# Patient Record
(demographics unavailable — no encounter records)

---

## 2024-11-27 NOTE — HISTORY OF PRESENT ILLNESS
[FreeTextEntry1] : Currently doing well. Occasional chest pain which is unrelated to exertion and usually very brief. Denies shortness of breath or palpitations.  passed away about 3 years ago. She moved back to [place] about 2 years ago.

## 2024-11-27 NOTE — DISCUSSION/SUMMARY
[Hypertension] : hypertension [Low Sodium Diet] : low sodium diet [FreeTextEntry1] : Currently stable from a cardiovascular standpoint. Hypertensive today (had left knee injection earlier today). Euvolemic. Atypical chest pains. Continue current medications. ECG completed today and reviewed (findings as noted above). Given patients CAD risk factors, will schedule an exercise nuclear stress test to rule out any significant CAD. In addition, will schedule an echocardiogram to assess her cardiac structures and function. Pending the test results, I will make further recommendations. [EKG obtained to assist in diagnosis and management of assessed problem(s)] : EKG obtained to assist in diagnosis and management of assessed problem(s)